# Patient Record
(demographics unavailable — no encounter records)

---

## 2024-10-30 NOTE — HEALTH RISK ASSESSMENT
[Yes] : Yes [Monthly or less (1 pt)] : Monthly or less (1 point) [1 or 2 (0 pts)] : 1 or 2 (0 points) [Never (0 pts)] : Never (0 points) [No] : In the past 12 months have you used drugs other than those required for medical reasons? No [Little interest or pleasure doing things] : 1) Little interest or pleasure doing things [Feeling down, depressed, or hopeless] : 2) Feeling down, depressed, or hopeless [0] : 2) Feeling down, depressed, or hopeless: Not at all (0) [PHQ-2 Negative - No further assessment needed] : PHQ-2 Negative - No further assessment needed [With Patient/Caregiver] : , with patient/caregiver [Reviewed no changes] : Reviewed, no changes [Any fall with injury in past year] : Patient reported fall with injury in the past year [de-identified] : Social events [Audit-CScore] : 1 [[id number]] : 0 [AdvancecareDate] : 10/24

## 2024-10-30 NOTE — REVIEW OF SYSTEMS
[Palpitations] : palpitations [Dyspnea on Exertion] : dyspnea on exertion [Joint Pain] : joint pain [Joint Stiffness] : joint stiffness [Muscle Pain] : muscle pain [Back Pain] : back pain [Dizziness] : dizziness [Negative] : Heme/Lymph [Chest Pain] : no chest pain [Leg Claudication] : no leg claudication [Lower Ext Edema] : no lower extremity edema [Orthopnea] : no orthopnea [Shortness Of Breath] : no shortness of breath [Wheezing] : no wheezing [Cough] : no cough [Joint Swelling] : no joint swelling [Muscle Weakness] : no muscle weakness [Headache] : no headache [Fainting] : no fainting [Confusion] : no confusion [Memory Loss] : no memory loss [Unsteady Walking] : no ataxia [FreeTextEntry5] : palpitation occur occasionally,recently seen by cardiology [FreeTextEntry9] :  right shoulder pain persists,Calcific tendinitis,Trigger finger [de-identified] : Paresthesias of lower extremities, tremor, positional vertigo

## 2024-10-30 NOTE — ASSESSMENT
[FreeTextEntry1] : Assessment and plan:  1.  Status post accidental fall in [place] patient did injure her left hand, left elbow and left shoulder x-ray showed no acute fractures but the patient did have a large hematoma of dorsum of left hand which finally resolved but still some residual discomfort of and and elbow persist.  I recommend conservative management ice alternating with heat patient could also use local treatment with IcyHot, Biofreeze or somata.  Fall was accidental patient did not have any syncopal episodes accidental tripped on curb.  2.  Diabetes mellitus type 2 non-insulin-requiring continue metformin 500 mg 1 tab daily and patient also follows a no concentrated sweet consistent carbohydrate diet patient's most recent blood work which was done at last visit showed that she had a hemoglobin A1c of 7.0.  For patient's age that is acceptable and good blood sugar control.  In this age group we do not want extremely tight blood sugar control.  3.  Heart disease patient will continue atorvastatin 10 mg p.o. daily at bedtime and metoprolol succinate ER 50 mg p.o. daily.  4.  Hypertension good blood pressure control with irbesartan hydrochlorothiazide 150-12.5.  5.  Paroxysmal atrial fibrillation rate is well controlled with beta-blocker metoprolol and patient will continue anticoagulation with Eliquis 5 mg p.o. daily  6.  Positional vertigo resolved basically on its own.  No further episodes since last visit.  7.  Total time spent face-to-face and non-face-to-face time 40 minutes the majority of which was spent on counseling and coordination of care .

## 2024-10-30 NOTE — HISTORY OF PRESENT ILLNESS
[FreeTextEntry1] : Patient presents today for follow-up and disease management presently denies any chest pain or shortness of breath.  Patient does admit to an accidental fall that occurred earlier this month October 7 subsequently patient was seen in the emergency department that is in [place] x-rays were done of left hand, left elbow and left shoulder there were no fractures but she did have a contusion of all 3 the worst being her left hand hematoma has finally resolved elbow pain persists. [de-identified] : Patient is an 80-year-old female who presents today for follow-up and disease management.  Medical history significant for diabetes mellitus type 2 non-insulin-requiring hypertension, left bundle branch block, myocardial bridge, paroxysmal atrial fibrillation, patient also has history of hypertension elevated BMI and essential tremor.  Presently the patient is awake alert and oriented x 3 in no acute distress, cooperative patient recently seen by cardiology on February 5, 2024 and consultation reviewed at today's visit.

## 2024-10-30 NOTE — PHYSICAL EXAM
[No Acute Distress] : no acute distress [Well Nourished] : well nourished [Well Developed] : well developed [Well-Appearing] : well-appearing [Normal Voice/Communication] : normal voice/communication [Normal Sclera/Conjunctiva] : normal sclera/conjunctiva [PERRL] : pupils equal round and reactive to light [EOMI] : extraocular movements intact [Normal Outer Ear/Nose] : the outer ears and nose were normal in appearance [Normal TMs] : both tympanic membranes were normal [No JVD] : no jugular venous distention [No Lymphadenopathy] : no lymphadenopathy [Supple] : supple [Thyroid Normal, No Nodules] : the thyroid was normal and there were no nodules present [No Respiratory Distress] : no respiratory distress  [Clear to Auscultation] : lungs were clear to auscultation bilaterally [Normal Rate] : normal rate  [Normal S1, S2] : normal S1 and S2 [No Carotid Bruits] : no carotid bruits [No Abdominal Bruit] : a ~M bruit was not heard ~T in the abdomen [No Palpable Aorta] : no palpable aorta [No Extremity Clubbing/Cyanosis] : no extremity clubbing/cyanosis [Soft] : abdomen soft [Non Tender] : non-tender [Normal Bowel Sounds] : normal bowel sounds [No Hernias] : no hernias [No CVA Tenderness] : no CVA  tenderness [No Spinal Tenderness] : no spinal tenderness [No Joint Swelling] : no joint swelling [Grossly Normal Strength/Tone] : grossly normal strength/tone [Coordination Grossly Intact] : coordination grossly intact [No Focal Deficits] : no focal deficits [Normal Gait] : normal gait [Deep Tendon Reflexes (DTR)] : deep tendon reflexes were 2+ and symmetric [Speech Grossly Normal] : speech grossly normal [Memory Grossly Normal] : memory grossly normal [Normal Affect] : the affect was normal [Alert and Oriented x3] : oriented to person, place, and time [Normal Mood] : the mood was normal [Normal Insight/Judgement] : insight and judgment were intact [Normal] : affect was normal and insight and judgment were intact [Comprehensive Foot Exam Normal] : Right and left foot were examined and both feet are normal. No ulcers in either foot. Toes are normal and with full ROM.  Normal tactile sensation with monofilament testing throughout both feet [de-identified] : non pitting edema ,varicose veins ,spider veins [de-identified] : GYN [de-identified] : obese [FreeTextEntry1] : GYN, GI [de-identified] : GYN [de-identified] : lower back pain (DJD) [de-identified] : chronic joint pain [de-identified] : paresthesia,tremor

## 2025-01-30 NOTE — ASSESSMENT
[FreeTextEntry1] : Assessment and plan:  1.  Status post accidental fall in [place] since that fall patient has had no further falls.  2.  Diabetes mellitus type 2 non-insulin-requiring continue metformin 500 mg 1 tab daily and patient also follows a no concentrated sweet consistent carbohydrate diet patient's most recent blood work which was done at last visit showed that she had a hemoglobin A1c of 7.5.  For patient's age that is acceptable and good blood sugar control.  In this age group we do not want extremely tight blood sugar control.  3.  Heart disease patient will continue atorvastatin 10 mg p.o. daily at bedtime and metoprolol succinate ER 50 mg p.o. daily.  4.  Hypertension good blood pressure control with irbesartan hydrochlorothiazide 150-12.5.  5.  Paroxysmal atrial fibrillation rate is well controlled with beta-blocker metoprolol and patient will continue anticoagulation with Eliquis 5 mg p.o. daily  6.  Positional vertigo resolved basically on its own.  No further episodes .  7.  Reviewed with patient evaluation done by [facility] recommendations were also reviewed.  8.  Total time spent face-to-face and non-face-to-face time 40 minutes the majority of which was spent on counseling and coordination of care .

## 2025-01-30 NOTE — HISTORY OF PRESENT ILLNESS
[FreeTextEntry1] : Follow-up and disease management [de-identified] : Patient is an 81-year-old female who presents today for follow-up and disease management.  Medical history significant for diabetes mellitus type 2 non-insulin-requiring hypertension, left bundle branch block, myocardial bridge, paroxysmal atrial fibrillation, patient also has history of hypertension elevated BMI and essential tremor.  Presently the patient is awake alert and oriented x 3 in no acute distress, cooperative patient recently seen by cardiology on February 5, 2024 and consultation reviewed at today's visit.

## 2025-01-30 NOTE — REVIEW OF SYSTEMS
[Palpitations] : palpitations [Dyspnea on Exertion] : dyspnea on exertion [Joint Pain] : joint pain [Joint Stiffness] : joint stiffness [Muscle Pain] : muscle pain [Back Pain] : back pain [Dizziness] : dizziness [Negative] : Heme/Lymph [Chest Pain] : no chest pain [Leg Claudication] : no leg claudication [Lower Ext Edema] : no lower extremity edema [Orthopnea] : no orthopnea [Shortness Of Breath] : no shortness of breath [Wheezing] : no wheezing [Cough] : no cough [Joint Swelling] : no joint swelling [Muscle Weakness] : no muscle weakness [Headache] : no headache [Fainting] : no fainting [Confusion] : no confusion [Memory Loss] : no memory loss [Unsteady Walking] : no ataxia [FreeTextEntry5] : palpitation occur occasionally,recently seen by cardiology [FreeTextEntry9] :  right shoulder pain persists,Calcific tendinitis,Trigger finger [de-identified] : Paresthesias of lower extremities, tremor, positional vertigo

## 2025-01-30 NOTE — HEALTH RISK ASSESSMENT
[Yes] : Yes [Monthly or less (1 pt)] : Monthly or less (1 point) [1 or 2 (0 pts)] : 1 or 2 (0 points) [Never (0 pts)] : Never (0 points) [No] : In the past 12 months have you used drugs other than those required for medical reasons? No [Any fall with injury in past year] : Patient reported fall with injury in the past year [Little interest or pleasure doing things] : 1) Little interest or pleasure doing things [Feeling down, depressed, or hopeless] : 2) Feeling down, depressed, or hopeless [0] : 2) Feeling down, depressed, or hopeless: Not at all (0) [PHQ-2 Negative - No further assessment needed] : PHQ-2 Negative - No further assessment needed [With Patient/Caregiver] : , with patient/caregiver [Reviewed no changes] : Reviewed, no changes [Designated Healthcare Proxy] : Designated healthcare proxy [Name: ___] : Health Care Proxy's Name: [unfilled]  [Relationship: ___] : Relationship: [unfilled] [Aggressive treatment] : aggressive treatment [I will adhere to the patient's wishes.] : I will adhere to the patient's wishes. [Never] : Never [Audit-CScore] : 1 [[id number]] : 0 [AdvancecareDate] : 01/25

## 2025-01-30 NOTE — COUNSELING

## 2025-01-30 NOTE — PHYSICAL EXAM
[No Acute Distress] : no acute distress [Well Nourished] : well nourished [Well Developed] : well developed [Well-Appearing] : well-appearing [Normal Voice/Communication] : normal voice/communication [Normal Sclera/Conjunctiva] : normal sclera/conjunctiva [PERRL] : pupils equal round and reactive to light [EOMI] : extraocular movements intact [Normal Outer Ear/Nose] : the outer ears and nose were normal in appearance [Normal TMs] : both tympanic membranes were normal [No JVD] : no jugular venous distention [No Lymphadenopathy] : no lymphadenopathy [Supple] : supple [Thyroid Normal, No Nodules] : the thyroid was normal and there were no nodules present [No Respiratory Distress] : no respiratory distress  [Clear to Auscultation] : lungs were clear to auscultation bilaterally [Normal Rate] : normal rate  [Normal S1, S2] : normal S1 and S2 [No Carotid Bruits] : no carotid bruits [No Abdominal Bruit] : a ~M bruit was not heard ~T in the abdomen [No Palpable Aorta] : no palpable aorta [No Extremity Clubbing/Cyanosis] : no extremity clubbing/cyanosis [Soft] : abdomen soft [Non Tender] : non-tender [Normal Bowel Sounds] : normal bowel sounds [No Hernias] : no hernias [No CVA Tenderness] : no CVA  tenderness [No Spinal Tenderness] : no spinal tenderness [No Joint Swelling] : no joint swelling [Grossly Normal Strength/Tone] : grossly normal strength/tone [Coordination Grossly Intact] : coordination grossly intact [No Focal Deficits] : no focal deficits [Normal Gait] : normal gait [Deep Tendon Reflexes (DTR)] : deep tendon reflexes were 2+ and symmetric [Speech Grossly Normal] : speech grossly normal [Memory Grossly Normal] : memory grossly normal [Normal Affect] : the affect was normal [Alert and Oriented x3] : oriented to person, place, and time [Normal Mood] : the mood was normal [Normal Insight/Judgement] : insight and judgment were intact [Normal] : affect was normal and insight and judgment were intact [Comprehensive Foot Exam Normal] : Right and left foot were examined and both feet are normal. No ulcers in either foot. Toes are normal and with full ROM.  Normal tactile sensation with monofilament testing throughout both feet [de-identified] : non pitting edema ,varicose veins ,spider veins [de-identified] : GYN [de-identified] : obese [FreeTextEntry1] : GYN, GI [de-identified] : GYN [de-identified] : lower back pain (DJD) [de-identified] : chronic joint pain [de-identified] : paresthesia,tremor

## 2025-02-06 NOTE — DISCUSSION/SUMMARY
[Patient] : the patient [Risks] : risks [Benefits] : benefits [Alternatives] : alternatives [___ Month(s)] : in [unfilled] month(s) [FreeTextEntry1] : Recommended echocardiography which will be performed today, arrange coronary CTA.  Follow-up with me in person or by phone thereafter will consider repeat event monitoring for stress testing to assess if abnormal rhythm causing her symptoms.  In the interim, continue Eliquis atorvastatin irbesartan with hydrochlorothiazide and metoprolol   otherwise follow-up  4-6 months.   Questions discussed with her.     [EKG obtained to assist in diagnosis and management of assessed problem(s)] : EKG obtained to assist in diagnosis and management of assessed problem(s)

## 2025-02-06 NOTE — REASON FOR VISIT
[Arrhythmia/ECG Abnorrmalities] : arrhythmia/ECG abnormalities [Hypertension] : hypertension [FreeTextEntry1] : No palpitations of significance no chest pain.  When she was with friends walking on vacation, she would at times often puff and have to stop and rest for 5 minutes before she would recover.

## 2025-02-06 NOTE — CARDIOLOGY SUMMARY
[de-identified] : December 19, 2022 sinus rhythm LBBB 7/31/23 sinus LBBB 2/5/24 sinus lbbb February 6, 2025 sinus LBBB 824 sinus lbbb [de-identified] : 2015 no ischemia [de-identified] : 2017 normal LV systolic function mild calcification aortic valve 2023 normal lvef [de-identified] : Event recorder July 2021 sinus rhythm paroxysmal A. fib [de-identified] : About 2003  normal angiography, myocardial bridge

## 2025-02-06 NOTE — ASSESSMENT
[FreeTextEntry1] : 81-year-old woman with diabetes paroxysmal AF LBBB noting dyspnea with exertion relieved by rest.  Considerations includes CAD myopathy paroxysmal arrhythmia

## 2025-02-06 NOTE — PHYSICAL EXAM
[Well Developed] : well developed [Well Nourished] : well nourished [No Acute Distress] : no acute distress [Normal Conjunctiva] : normal conjunctiva [Normal Venous Pressure] : normal venous pressure [No Carotid Bruit] : no carotid bruit [Normal S1, S2] : normal S1, S2 [No Rub] : no rub [No Gallop] : no gallop [Clear Lung Fields] : clear lung fields [Good Air Entry] : good air entry [No Respiratory Distress] : no respiratory distress  [Soft] : abdomen soft [Non Tender] : non-tender [No Masses/organomegaly] : no masses/organomegaly [Normal Bowel Sounds] : normal bowel sounds [Normal Gait] : normal gait [No Edema] : no edema [No Cyanosis] : no cyanosis [No Clubbing] : no clubbing [No Rash] : no rash [No Skin Lesions] : no skin lesions [Moves all extremities] : moves all extremities [No Focal Deficits] : no focal deficits [Normal Speech] : normal speech [Alert and Oriented] : alert and oriented [Normal memory] : normal memory

## 2025-03-20 NOTE — DISCUSSION/SUMMARY
[Patient] : the patient [Risks] : risks [Benefits] : benefits [Alternatives] : alternatives [___ Month(s)] : in [unfilled] month(s) [FreeTextEntry1] : 81-year-old woman with conduction disease and paroxysmal atrial fibrillation.  BP well-controlled.  Overweight. Normal coronary CTA continue Eliquis atorvastatin irbesartan with hydrochlorothiazide and metoprolol   otherwise follow-up  4-6 months.   Questions discussed with her.

## 2025-03-20 NOTE — REASON FOR VISIT
[Arrhythmia/ECG Abnorrmalities] : arrhythmia/ECG abnormalities [Hypertension] : hypertension [FreeTextEntry1] : No palpitations of significance no chest pain.  .

## 2025-03-20 NOTE — CARDIOLOGY SUMMARY
[de-identified] : December 19, 2022 sinus rhythm LBBB 7/31/23 sinus LBBB 2/5/24 sinus lbbb February 6, 2025 sinus LBBB 824 sinus lbbb [de-identified] : 2015 no ischemia [de-identified] : 2017 normal LV systolic function mild calcification aortic valve 2023 normal lvef  February 2025 EF 54 mild MR [de-identified] : February 2025 CAC 0 no coronary disease [de-identified] : Event recorder July 2021 sinus rhythm paroxysmal A. fib [de-identified] : About 2003  normal angiography, myocardial bridge